# Patient Record
Sex: MALE | Race: BLACK OR AFRICAN AMERICAN | Employment: STUDENT | URBAN - METROPOLITAN AREA
[De-identification: names, ages, dates, MRNs, and addresses within clinical notes are randomized per-mention and may not be internally consistent; named-entity substitution may affect disease eponyms.]

---
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
## 2024-01-24 ENCOUNTER — OFFICE VISIT (OUTPATIENT)
Dept: FAMILY MEDICINE CLINIC | Facility: CLINIC | Age: 24
End: 2024-01-24
Payer: MEDICAID

## 2024-01-24 VITALS
WEIGHT: 171.81 LBS | HEIGHT: 71 IN | OXYGEN SATURATION: 97 % | TEMPERATURE: 98 F | RESPIRATION RATE: 16 BRPM | SYSTOLIC BLOOD PRESSURE: 108 MMHG | BODY MASS INDEX: 24.05 KG/M2 | HEART RATE: 58 BPM | DIASTOLIC BLOOD PRESSURE: 52 MMHG

## 2024-01-24 DIAGNOSIS — M79.601 BILATERAL ARM PAIN: ICD-10-CM

## 2024-01-24 DIAGNOSIS — Z11.3 ROUTINE SCREENING FOR STI (SEXUALLY TRANSMITTED INFECTION): ICD-10-CM

## 2024-01-24 DIAGNOSIS — Z00.00 ROUTINE GENERAL MEDICAL EXAMINATION AT A HEALTH CARE FACILITY: Primary | ICD-10-CM

## 2024-01-24 DIAGNOSIS — M79.602 BILATERAL ARM PAIN: ICD-10-CM

## 2024-01-24 PROCEDURE — 90686 IIV4 VACC NO PRSV 0.5 ML IM: CPT | Performed by: FAMILY MEDICINE

## 2024-01-24 PROCEDURE — 99203 OFFICE O/P NEW LOW 30 MIN: CPT | Performed by: FAMILY MEDICINE

## 2024-01-24 PROCEDURE — 3008F BODY MASS INDEX DOCD: CPT | Performed by: FAMILY MEDICINE

## 2024-01-24 PROCEDURE — 3074F SYST BP LT 130 MM HG: CPT | Performed by: FAMILY MEDICINE

## 2024-01-24 PROCEDURE — 99385 PREV VISIT NEW AGE 18-39: CPT | Performed by: FAMILY MEDICINE

## 2024-01-24 PROCEDURE — 3078F DIAST BP <80 MM HG: CPT | Performed by: FAMILY MEDICINE

## 2024-01-24 PROCEDURE — 90471 IMMUNIZATION ADMIN: CPT | Performed by: FAMILY MEDICINE

## 2024-01-24 NOTE — PROGRESS NOTES
Sugey Quintana is a 23 year old male who is here for   Chief Complaint   Patient presents with    Wellness Visit    Pain     Bilateral biceps since August pain is worse when exercise         HPI:     1. Routine general medical examination at a health care facility  -due for wellness  -notes he is up to date with childhood vaccinations    2. Bilateral arm pain  -started 2 months ago  -preceded by intensely working out biceps   -thought was sore, but tried to work through it      Screening:  Diet: watching diet  Exercise: trying  Sleep: normal  Depression/Anxiety: none    Testicular CA - counseled   Prostate CA - There are no preventive care reminders to display for this patient. No fam hx  Colon CA - No recommendations at this time no fam hx    No smoking cigarettes  No ETOH  Marijuana off and on - few times per week      History   Smoking Status    Never   Smokeless Tobacco    Not on file       The patient is not currently a tobacco user.  Counseling given: Not Answered      History   Alcohol Use Not Currently       History   Drug Use    Types: Cannabis         Depression Screening (PHQ-2/PHQ-9): Over the LAST 2 WEEKS   Little interest or pleasure in doing things (over the last two weeks)?: Not at all  Little interest or pleasure in doing things: Not at all    Feeling down, depressed, or hopeless (over the last two weeks)?: Not at all  Feeling down, depressed, or hopeless: Not at all    PHQ-2 SCORE: 0  PHQ-2 SCORE: 0          Pertinent Fam Hx:    Family History   Problem Relation Age of Onset    Diabetes Maternal Grandmother        Social History     Socioeconomic History    Marital status: Single   Tobacco Use    Smoking status: Never   Vaping Use    Vaping Use: Never used   Substance and Sexual Activity    Alcohol use: Not Currently    Drug use: Yes     Types: Cannabis   Other Topics Concern    Caffeine Concern No    Exercise Yes    Seat Belt Yes    Special Diet No    Stress Concern No    Weight Concern No        Wt Readings from Last 6 Encounters:   01/24/24 171 lb 12.8 oz (77.9 kg)   10/05/20 145 lb (65.8 kg) (46%, Z= -0.10)*     * Growth percentiles are based on CDC (Boys, 2-20 Years) data.       There is no problem list on file for this patient.      No current outpatient medications on file prior to visit.     No current facility-administered medications on file prior to visit.       REVIEW OF SYSTEMS:     See HPI for relevant ROS  GENERAL HEALTH: no other complaints  NEURO: no other complaints  VISION: no other complaints  RESPIRATORY: no other complaints  CARDIOVASCULAR: no other complaints  GI: no other complaints  : no other complaints  SKIN: no other complaints  PSYCH: no other complaints  EXT: no other complaints    Wt Readings from Last 6 Encounters:   01/24/24 171 lb 12.8 oz (77.9 kg)   10/05/20 145 lb (65.8 kg) (46%, Z= -0.10)*     * Growth percentiles are based on CDC (Boys, 2-20 Years) data.       No Known Allergies    There is no problem list on file for this patient.      Family History   Problem Relation Age of Onset    Diabetes Maternal Grandmother       Past Medical History:   Diagnosis Date    Asthma       History reviewed. No pertinent surgical history.   Social History:    Social History     Socioeconomic History    Marital status: Single   Tobacco Use    Smoking status: Never   Vaping Use    Vaping Use: Never used   Substance and Sexual Activity    Alcohol use: Not Currently    Drug use: Yes     Types: Cannabis   Other Topics Concern    Caffeine Concern No    Exercise Yes    Seat Belt Yes    Special Diet No    Stress Concern No    Weight Concern No           EXAM:   /52   Pulse 58   Temp 97.7 °F (36.5 °C) (Temporal)   Resp 16   Ht 5' 11\" (1.803 m)   Wt 171 lb 12.8 oz (77.9 kg)   SpO2 97%   BMI 23.96 kg/m²     GENERAL: A&O, in no apparent distress  HEENT: atraumatic, MMM, throat is clear  EYES: PERRLA, EOMI  NECK: supple, no thyromegaly  CHEST: no tenderness  LUNGS: clear to  auscultation bilateraly, no c/w/r  CARDIO: RRR without murmurs  NEURO: CN II-XII grossly intact  PSYCH: pleasant  MUSCULOSKELETAL: normal gait, no appreciable defects  SKIN: no rashes,no suspicious lesions    Problem focused exam (for problems outside of physical, if any):  Pain with palpation of b/l distal biceps tendon    The ASCVD Risk score (Luis Enrique DK, et al., 2019) failed to calculate for the following reasons:    The 2019 ASCVD risk score is only valid for ages 40 to 79    ASSESSMENT AND PLAN:     Health Maintenance  -We discussed the following:  Healthy diet and exercise, immunizations, and cancer screening    -Fasting labs ordered    1. Routine general medical examination at a health care facility  - Fluzone Quadrivalent 6mo and older, 0.5mL  - CBC With Differential With Platelet; Future  - Comp Metabolic Panel (14); Future  - Lipid Panel; Future  - TSH W Reflex To Free T4; Future  - HIV Ag/Ab Combo; Future  - Chlamydia/Gc Amplification; Future  - T Pallidum Screening [facility]; Future  - Hepatitis Panel, Acute (4); Future    2. Routine screening for STI (sexually transmitted infection)  - HIV Ag/Ab Combo; Future  - Chlamydia/Gc Amplification; Future  - T Pallidum Screening Cascade; Future  - Hepatitis Panel, Acute (4); Future    3. Bilateral arm pain  -likely overuse/strain  -limit workout of biceps/back for now  -limit weights  -focus more on cardio  -consider restarting in 2 months - but take it easy  -f/u prn         Chronic Conditions:    No problem-specific Assessment & Plan notes found for this encounter.       Immunizations:      Topic Date Due    Hepatitis B Vaccines (1 of 3 - 3-dose series) Never done    MMR Vaccines (1 of 1 - Standard series) Never done    Varicella Vaccines (1 of 2 - 2-dose childhood series) Never done    DTaP,Tdap,and Td Vaccines (1 - Tdap) Never done        Health Maintenance:  Health Maintenance   Topic Date Due    Annual Physical  Never done    Hepatitis B Vaccines (1 of 3 -  3-dose series) Never done    MMR Vaccines (1 of 1 - Standard series) Never done    Varicella Vaccines (1 of 2 - 2-dose childhood series) Never done    DTaP,Tdap,and Td Vaccines (1 - Tdap) Never done    COVID-19 Vaccine (3 - 2023-24 season) 09/01/2023    Influenza Vaccine (1) 10/01/2023    Annual Depression Screening  Completed    Meningococcal Vaccine  Completed    HPV Vaccines  Completed    Pneumococcal Vaccine: Birth to 64yrs  Aged Out    Hepatitis A Vaccines  Aged Out       Orders This Visit:  Orders Placed This Encounter   Procedures    CBC With Differential With Platelet    Comp Metabolic Panel (14)    Lipid Panel    TSH W Reflex To Free T4    HIV Ag/Ab Combo    T Pallidum Screening [facility]    Hepatitis Panel, Acute (4)    Fluzone Quadrivalent 6mo and older, 0.5mL    Chlamydia/Gc Amplification       Meds This Visit:  Requested Prescriptions      No prescriptions requested or ordered in this encounter       Imaging & Referrals:  INFLUENZA VAC, QUAD, PRSV FREE, 0.5 ML     The patient indicates understanding of these issues and agrees to the plan.  The patient is asked to return in prn.  MELANIE VARGAS MD    I spent a total of 30 minutes, more than half of which was spent counseling/coordinating care regarding biceps pain (outside of time for wellness)

## 2024-01-24 NOTE — PATIENT INSTRUCTIONS
Stop working out arms and back  Wait at least 2-3 months and slowly reintroduce to give more time to heal  Try not to overdo anything and take it slow    Go to lab for blood and urine test    Followup in 1 yr, sooner if needed    --------------------------------------------------------------------    If labs ordered:  -- schedule appt for fasting bloodwork anytime that you are able to (fast for 8-10 hours minimum, no food.  Water is fine).  -- go to [employer] or use [email or web address] to schedule [facility] labs  -- call [facility] or use website to schedule labs if your insurance prefers [facility] (Always confirm your preferred lab with your insurance, and let us know if you need labs ordered at a specific location)  -- we will call with results about 5-7 days after bloodwork is completed    Always verify coverage of any testing or specialist referral with your insurance    Work on healthy nutrition:  -focus on plant based, low-fat proteins  -limit fatty, red, or processed meats  -decrease carbohydrates (bread, rice, pasta, tortillas, sweets, sodas, juice, energy drinks)  -eat more fruits and veggies  -1/2 of every meal should be fruits/veggies; 1/4 should be protein, only 1/4 should be carbohydrates  -can work on decreasing portion sizes with each meal and drink plenty of water with each meal   -eat slowly - the brain can take up to 20min to realize stomach is full (easier to overeat when you eat fast)  -try to eat consistently throughout the day - can use healthy proteins or fiber rich foods for snacks in between meals (nuts, oatmeal, fruits, veggies)  -can you calorie tracking apps (myfitness pal or similar) to everything you eat for up to 2 wks to get a sense of what you are eating    Increase exercise:  -goal is 20-30min of continuous cardio (increased heart-rate and sweating) for 3-4 times per week  -work your way slowly up to this  -can focus on low impact exercises (elliptical, cycling, swimming) if you have joint pains  with walking/jogging/running    For sleep:  -make sure room is dark and quiet  -no reading, tv, phone, tablets, computers in bed - these can activate the brain over time and associate being awake with being in the bed  -if you are not sleeping, leave the bedroom, do any of the above until you are tired, then try again  -this will help reinforce with the brain the the bed is for sleeping  -consider melatonin 5-6mg nightly for 4-6 wks to help with sleep  -can use OTC benadryl or unisom as needed on top of this    Skin health:  -always use sunscreen (30+ spf) if out in the sun for longer than 10-15min  -cover up if needed  -reapply sunscreen every 2 hours  -consider seeing a dermatologist for a full skin exam every 1-2 years if you have had a lot of sun exposure in your life or if you have a lot of moles

## 2024-05-29 ENCOUNTER — OFFICE VISIT (OUTPATIENT)
Dept: FAMILY MEDICINE CLINIC | Facility: CLINIC | Age: 24
End: 2024-05-29

## 2024-05-29 VITALS
WEIGHT: 171.81 LBS | TEMPERATURE: 98 F | SYSTOLIC BLOOD PRESSURE: 112 MMHG | DIASTOLIC BLOOD PRESSURE: 76 MMHG | HEART RATE: 53 BPM | HEIGHT: 71 IN | RESPIRATION RATE: 16 BRPM | BODY MASS INDEX: 24.05 KG/M2 | OXYGEN SATURATION: 98 %

## 2024-05-29 DIAGNOSIS — M79.601 BILATERAL ARM PAIN: Primary | ICD-10-CM

## 2024-05-29 DIAGNOSIS — M79.602 BILATERAL ARM PAIN: Primary | ICD-10-CM

## 2024-05-29 PROCEDURE — 99214 OFFICE O/P EST MOD 30 MIN: CPT | Performed by: FAMILY MEDICINE

## 2024-05-29 NOTE — PROGRESS NOTES
Sugey Quintana is a 23 year old male here for   Chief Complaint   Patient presents with    Arm Pain     Bilateral bicep and arm pain        HPI:       1. Bilateral arm pain  -notes pain b/l in both arms at distal aspect of biceps/tendons  -was worse previously (5 months ago) after heavy work outs  -since then, has stopped workouts and still not better  -pain worse when carrying his nephew or lifting anything with his arms          HISTORY:  Past Medical History:    Asthma (HCC)      History reviewed. No pertinent surgical history.   Family History   Problem Relation Age of Onset    Diabetes Maternal Grandmother       Social History:   Social History     Socioeconomic History    Marital status: Single   Tobacco Use    Smoking status: Never   Vaping Use    Vaping status: Never Used   Substance and Sexual Activity    Alcohol use: Not Currently    Drug use: Yes     Types: Cannabis   Other Topics Concern    Caffeine Concern No    Exercise Yes    Seat Belt Yes    Special Diet No    Stress Concern No    Weight Concern No     Social Determinants of Health      [facility] [employer] [employer]        Medications (Active prior to today's visit):  No current outpatient medications on file.       Allergies:  No Known Allergies      ROS:   See HPI for relevant ROS    --GEN: No other complaints  --HEENT: No other complaints  --RESP: No other complaints  --CV: No other complaints  --GI: No other complaints  --MSK: No other complaints    All other systems reviewed are negative    PHYSICAL EXAM:   /76 (BP Location: Right arm, Patient Position: Sitting, Cuff Size: adult)   Pulse 53   Temp 98 °F (36.7 °C) (Temporal)   Resp 16   Ht 5' 11\" (1.803 m)   Wt 171 lb 12.8 oz (77.9 kg)   SpO2 98%   BMI 23.96 kg/m²     Gen: NAD  Ext: pain on palpation of b/l distal biceps tendon  Psych: normal affect     ASSESSMENT/PLAN:     1. Bilateral arm pain  -referred to PT  -avoid anything that makes pain worse    - OP  REFERRAL TO [facility]        Chronic Conditions:    No problem-specific Assessment & Plan notes found for this encounter.       Health Maintenance:  Health Maintenance   Topic Date Due    MMR Vaccines (1 of 1 - Standard series) Never done    Varicella Vaccines (1 of 2 - 13+ 2-dose series) Never done    DTaP,Tdap,and Td Vaccines (1 - Tdap) Never done    Hepatitis B Vaccines (1 of 3 - 19+ 3-dose series) Never done    COVID-19 Vaccine (3 - 2023-24 season) 09/01/2023    Annual Physical  01/24/2025    Influenza Vaccine  Completed    Annual Depression Screening  Completed    Meningococcal Vaccine  Completed    HPV Vaccines  Completed    Pneumococcal Vaccine: Birth to 64yrs  Aged Out    Hepatitis A Vaccines  Aged Out               The patient is asked to return in prn.    Orders This Visit:  No orders of the defined types were placed in this encounter.      Meds This Visit:  Requested Prescriptions      No prescriptions requested or ordered in this encounter       Imaging & Referrals:  OP REFERRAL TO [facility]     MELANIE VARGAS MD    I spent a total of 30 minutes, more than half of which was spent counseling/coordinating care regarding arm pain

## 2024-06-24 ENCOUNTER — OFFICE VISIT (OUTPATIENT)
Dept: PHYSICAL THERAPY | Age: 24
End: 2024-06-24
Attending: FAMILY MEDICINE
Payer: MEDICAID

## 2024-06-24 DIAGNOSIS — M79.601 BILATERAL ARM PAIN: Primary | ICD-10-CM

## 2024-06-24 DIAGNOSIS — M79.602 BILATERAL ARM PAIN: Primary | ICD-10-CM

## 2024-06-24 PROCEDURE — 97110 THERAPEUTIC EXERCISES: CPT

## 2024-06-24 PROCEDURE — 97161 PT EVAL LOW COMPLEX 20 MIN: CPT

## 2024-06-24 NOTE — PROGRESS NOTES
SHOULDER EVALUATION:     Diagnosis:   Bilateral arm pain (M79.601,M79.602)   Referring Provider: Mynor Anne MD Date of Evaluation:    6/24/2024    Precautions:  None Next MD visit:   none scheduled  Date of Surgery: n/a     PATIENT SUMMARY   Sugey Quintana is a 23 year old male who presents to therapy today with complaints of B distal bicep pain for almost 1 year. When onset, denies any bruising ; was advised to decrease the amount of weight he was lifting. Ultimately he stopped working out entirely ~6 months ago 2o this pain. When he was working out, he does admit he wasn't warming up, but he did focus on proper form. He 'felt like his biceps were going to explode\" when doing dumbbell bicep curls 40-45# each arm, but c limited reps, about 4 days a week. Denies any neck pain, UE N/T, or radiating pain proximal or distal to the elbow. Recalls a brief period when he was about 9 y/o when his RUE was in a sling for a short period ; no cast ; does not recall reason. Was in PT p 'falling on his head' playing basketball, about 2 yrs ago. Pain only in antecubital fossa. R hand dominant. Denies any other areas of tendon pain like knees or ankles. Sometimes can even wake up and already hurting - back or side sleeper.     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   Current functional limitations include pain c carrying 2 y/o cousin, lifting heavy groceries, when sitting at rest c elbows bent, UA to weightlift for exercise.     Sugey describes prior level of function full and painfree. Pt goals include painfree ADLs and at rest, able to return to weightlifting.  Past medical history was reviewed with Sugey. Significant findings include asthma.     ASSESSMENT  Sugey presents to physical therapy evaluation with primary c/o B bicep pain for past 1 year. The results of the objective tests and measures show TTP B biceps muscle belly and insertions, pain c  full elbow extension c overpressure.  Pt demo full and painfree MMT testing and AROM BUEs. Functional deficits include but are not limited to pain c lifting heavy groceries, when sitting at rest c elbows bent, carrying 2 y/o cousin, UA to weightlift for exercise.  Signs and symptoms are consistent with rehab diagnosis, possible chronic insertional biceps tendonitis. Pt and PT discussed evaluation findings, pathology, POC and HEP.  Pt voiced understanding and performs HEP correctly without reported pain. Skilled Physical Therapy is medically necessary to address the above impairments and reach functional goals.     OBJECTIVE:   Observation/Posture: scar tissue in occipital region from previous fall playing basketball ; normal muscular tone in BUEs s abnormalities  Palpation: TTP throughout B biceps muscle belly  Sensation: intact to light touch BUEs  Cervical Screen: full and painfree all planes s any change in UE symptoms    ROM: (* denotes performed with pain)  AROM Full and painfree B shoulders and B elbows  PROM elbow flexion: full and painfree B c overpressure  PROM elbow extension: full c mild posterior elbow pain c light overpressure B    Strength/MMT: (* denotes performed with pain)  @eval:   Elbow Flexion: R 5/5; L 5/5  Elbow Extension: R 5/5; L 5/5  Supination: R 5/5; L 5/5  Pronation: R 5/5; L 5/5      Special tests:   (-) Speed's test B  Muscle flexibility/length: WNL painfree B biceps and triceps  (-) ULNTT's medial, ulnar, radial B  (-) Maggy for thoracic outlet    Today’s Treatment and Response:   Pt education was provided on exam findings, treatment diagnosis, treatment plan, expectations, and prognosis. Pt was also provided recommendations for activity modifications and modalities as needed [ice/heat].  Patient was instructed in and issued a HEP for:   Access Code: [email or web address] ; URL: [email or web address] ; Prepared by: Malena Fam  Date: 06/25/2024  Exercises  - Sidelying Thoracic  Rotation with Open Book  - 2 x daily - 10 reps - 5 hold  - Doorway Pec Stretch at 90 Degrees Abduction  - 2 x daily - 3 reps - 15 hold    Charges: PT Maryaal Low Complexity, therex-1      Total Timed Treatment: 15 min     Total Treatment Time: 40 min     Based on clinical rationale and outcome measures, this evaluation involved Low Complexity decision making .  PLAN OF CARE:    Goals: (to be met in 8 visits)   Consistently decr pain < or = 3/10 for incr QOL and activity tolerance  Overall incr in function as indicated by decr QuickDASH at least 10 pts  Painfree at rest to indicate decr irritability of symptoms  Painfree full elbow extension c overpressure to indicate decr irritability of symptoms  Pt tolerates lifting/carrying groceries painfree for incr functional use of BUEs  Pt tolerates progressively more difficult resistive exercise in PT remaining painfree to indicate incr exercise tolerance/PLOF  Indep HEP to promote cont progress toward functional goals    Frequency / Duration: Patient will be seen for 1-2 x/week or a total of 6 visits over a 90 day period. Treatment will include: Manual Therapy, Therapeutic Exercise, Home Exercise Program instruction, and Modalities to include: Ultrasound and dry needling if indicated    Education or treatment limitation: None  Rehab Potential:good    QuickDASH Outcome Score  Score: 13.64 % (6/24/2024 11:51 AM)      Patient/Family/Caregiver was advised of these findings, precautions, and treatment options and has agreed to actively participate in planning and for this course of care.    Thank you for your referral. Please co-sign or sign and return this letter via fax as soon as possible to [phone]. If you have any questions, please contact me at Dept: [phone]    Sincerely,  Electronically signed by therapist: Malena Fam, PT  Physician's certification required: Yes  I certify the need for these services furnished under this plan of treatment and while under my  care.    X___________________________________________________ Date____________________    Certification From: 6/24/2024  To:9/22/2024

## 2024-06-26 ENCOUNTER — APPOINTMENT (OUTPATIENT)
Dept: PHYSICAL THERAPY | Age: 24
End: 2024-06-26
Attending: FAMILY MEDICINE
Payer: MEDICAID

## 2024-06-28 NOTE — PROGRESS NOTES
PT DAILY NOTE  Diagnosis:   Bilateral arm pain (M79.601,M79.602)   Referring Provider: Mynor Anne MD Date of Evaluation:    6/24/2024    Precautions:  None Next MD visit:   none scheduled  Date of Surgery: n/a     Insurance Primary/Secondary: [place] [employer]     Visit 2 of 6   Date POC Expires: 8-23-24       Subjective: Pt states he feels about the same. Sore in front of elbows and in biceps muscles B.  Pain:      1/10   @eval: Sugey Quintana is a 23 year old male who presents to therapy today with complaints of B distal bicep pain for almost 1 year. When onset, denies any bruising ; was advised to decrease the amount of weight he was lifting. Ultimately he stopped working out entirely ~6 months ago 2o this pain. When he was working out, he does admit he wasn't warming up, but he did focus on proper form. He 'felt like his biceps were going to explode\" when doing dumbbell bicep curls 40-45# each arm, but c limited reps, about 4 days a week. Denies any neck pain, UE N/T, or radiating pain proximal or distal to the elbow. Recalls a brief period when he was about 7 y/o when his RUE was in a sling for a short period ; no cast ; does not recall reason. Was in PT p 'falling on his head' playing basketball, about 2 yrs ago. Pain only in antecubital fossa. R hand dominant. Denies any other areas of tendon pain like knees or ankles. Sometimes can even wake up and already hurting - back or side sleeper.   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   Current functional limitations include pain c carrying 2 y/o cousin, lifting heavy groceries, when sitting at rest c elbows bent, UA to weightlift for exercise.   Rosendojoyce describes prior level of function full and painfree. Pt goals include painfree ADLs and at rest, able to return to weightlifting.  Past medical history was reviewed with Sugey. Significant findings include asthma.      Objective:   Tightness/prominence and TTP noted in proximal pronator teres B, in addition to biceps muscle belly      Treatment:  (\"NP\" indicates Not Performed this date)  Manual Therapy:  Rogers B biceps, proximal pronator teres in elbow extension    Neuromuscular Re-education:    Therapeutic Exercise: Eval 6-24-24 7-1-24   Retro UBE for scap retraction/upp thor ext   3'   S/L open book R,L 5\"x5ea  5\"x10ea   Doorway pec stretch 15\"x3     Standing wrist flexor stretch  20\"x3ea    Standing bicep stretch R,L  20\"x3ea     Eccentric biceps-standing R,L   8#x20ea                                    HEP:  Access Code: [email or web address] ; URL: [email or web address] ; Prepared by: Malena Fam  Date: 06/25/2024  Exercises  - Sidelying Thoracic Rotation with Open Book  - 2 x daily - 10 reps - 5 hold  - Doorway Pec Stretch at 90 Degrees Abduction  - 2 x daily - 3 reps - 15 hold  7-1: add - Standing Wrist Extension Stretch  - 2 x daily - 3 reps - 20 hold ; - Standing Bicep Stretch at Wall  - 2 x daily - 3 reps - 20 hold ; eccentric biceps c 8-10# wt      Assessment/Plan: Pt has no change to symptoms thus far. Muscle tightness, TTP noted medial elbow B c prominence of pronator teres. Non-TTP medial epicondyle. Added manual tx and additional stretches to promote improved mobility of soft tissues in this region. Also added eccentric biceps strengthening to promote tendon strengthening/remodeling. Continue to address deficits to work toward PLOF and set goals.    PLAN OF CARE:    Goals: (to be met in 8 visits)   Consistently decr pain < or = 3/10 for incr QOL and activity tolerance  Overall incr in function as indicated by decr QuickDASH at least 10 pts  Painfree at rest to indicate decr irritability of symptoms  Painfree full elbow extension c overpressure to indicate decr irritability of symptoms  Pt tolerates lifting/carrying groceries painfree for incr functional use of BUEs  Pt tolerates  progressively more difficult resistive exercise in PT remaining painfree to indicate incr exercise tolerance/PLOF  Indep HEP to promote cont progress toward functional goals    Frequency / Duration: Patient will be seen for 1-2 x/week or a total of 6 visits over a 90 day period.    All Treatments performed at distinct and separate times during the therapy session.  Treatment Minutes  Units charged   Manual Therapy 20 minutes 1   Therapeutic Activity 0 minutes    Neuromuscular Re-education 0 minutes    Therapeutic Exercise 25 minutes 2   Total Direct Treatment Time 45 minutes

## 2024-07-01 ENCOUNTER — OFFICE VISIT (OUTPATIENT)
Dept: PHYSICAL THERAPY | Age: 24
End: 2024-07-01
Attending: FAMILY MEDICINE
Payer: MEDICAID

## 2024-07-01 DIAGNOSIS — M79.601 BILATERAL ARM PAIN: Primary | ICD-10-CM

## 2024-07-01 DIAGNOSIS — M79.602 BILATERAL ARM PAIN: Primary | ICD-10-CM

## 2024-07-01 PROCEDURE — 97110 THERAPEUTIC EXERCISES: CPT

## 2024-07-01 PROCEDURE — 97140 MANUAL THERAPY 1/> REGIONS: CPT

## 2024-07-08 ENCOUNTER — APPOINTMENT (OUTPATIENT)
Dept: PHYSICAL THERAPY | Age: 24
End: 2024-07-08
Attending: FAMILY MEDICINE
Payer: MEDICAID

## 2024-07-10 ENCOUNTER — APPOINTMENT (OUTPATIENT)
Dept: PHYSICAL THERAPY | Age: 24
End: 2024-07-10
Attending: FAMILY MEDICINE
Payer: MEDICAID

## 2024-07-10 NOTE — PROGRESS NOTES
PT DAILY NOTE  Diagnosis:   Bilateral arm pain (M79.601,M79.602)   Referring Provider: Mynor Anne MD Date of Evaluation:    6/24/2024    Precautions:  None Next MD visit:   none scheduled  Date of Surgery: n/a     Insurance Primary/Secondary: [place] [employer]     Visit 3 of 6   Date POC Expires: 8-23-24       Subjective: Pt states he found an 8# dumbbell at home that he has been using for eccentric biceps exercise. He isn't feeling the pain anymore when driving.   Pain:      1/10   @eval: Sugey Quintana is a 23 year old male who presents to therapy today with complaints of B distal bicep pain for almost 1 year. When onset, denies any bruising ; was advised to decrease the amount of weight he was lifting. Ultimately he stopped working out entirely ~6 months ago 2o this pain. When he was working out, he does admit he wasn't warming up, but he did focus on proper form. He 'felt like his biceps were going to explode\" when doing dumbbell bicep curls 40-45# each arm, but c limited reps, about 4 days a week. Denies any neck pain, UE N/T, or radiating pain proximal or distal to the elbow. Recalls a brief period when he was about 9 y/o when his RUE was in a sling for a short period ; no cast ; does not recall reason. Was in PT p 'falling on his head' playing basketball, about 2 yrs ago. Pain only in antecubital fossa. R hand dominant. Denies any other areas of tendon pain like knees or ankles. Sometimes can even wake up and already hurting - back or side sleeper.   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   Current functional limitations include pain c carrying 2 y/o cousin, lifting heavy groceries, when sitting at rest c elbows bent, UA to weightlift for exercise.   Sugey describes prior level of function full and painfree. Pt goals include painfree ADLs and at rest, able to return to weightlifting.  Past medical history was reviewed  with Sugey. Significant findings include asthma.     Objective:     Treatment:  (\"NP\" indicates Not Performed this date)  Manual Therapy:  Randy and ALE B biceps, proximal pronator teres in elbow extension    Neuromuscular Re-education:    Therapeutic Exercise: Eval 6-24-24 7-1-24 7-11-24   Retro UBE for scap retraction/upp thor ext   3' 4'   S/L open book R,L 5\"x5ea  5\"x10ea 5\"x10ea   Doorway pec stretch 15\"x3      Standing wrist flexor stretch  20\"x3ea  20\"x3ea   Standing bicep stretch R,L  20\"x3ea   20\"x3ea   Eccentric biceps-standing R,L   8#x20ea 8#x20ea   Triceps extension on FM R,L    13#2x10ea                                 HEP:  Access Code: [email or web address] ; URL: [email or web address] ; Prepared by: Malena Fam  Date: 06/25/2024  Exercises  - Sidelying Thoracic Rotation with Open Book  - 2 x daily - 10 reps - 5 hold  - Doorway Pec Stretch at 90 Degrees Abduction  - 2 x daily - 3 reps - 15 hold  7-1: add - Standing Wrist Extension Stretch  - 2 x daily - 3 reps - 20 hold ; - Standing Bicep Stretch at Wall  - 2 x daily - 3 reps - 20 hold ; eccentric biceps c 8-10# wt  7-11: add triceps extension      Assessment/Plan: Pt reports decr in symptoms when at rest and when driving. Continued symptoms when lifting/carrying. Added triceps strengthening to promote reciprocal inhibition to biceps. Pt to add to HEP and f/u next week.    PLAN OF CARE:    Goals: (to be met in 8 visits)   Consistently decr pain < or = 3/10 for incr QOL and activity tolerance  Overall incr in function as indicated by decr QuickDASH at least 10 pts  Painfree at rest to indicate decr irritability of symptoms  Painfree full elbow extension c overpressure to indicate decr irritability of symptoms  Pt tolerates lifting/carrying groceries painfree for incr functional use of BUEs  Pt tolerates progressively more difficult resistive exercise in PT remaining painfree to indicate incr exercise tolerance/PLOF  Indep HEP to promote  cont progress toward functional goals    Frequency / Duration: Patient will be seen for 1-2 x/week or a total of 6 visits over a 90 day period.    All Treatments performed at distinct and separate times during the therapy session.  Treatment Minutes  Units charged   Manual Therapy 20 minutes 1   Therapeutic Activity 0 minutes    Neuromuscular Re-education 0 minutes    Therapeutic Exercise 25 minutes 2   Total Direct Treatment Time 45 minutes

## 2024-07-11 ENCOUNTER — OFFICE VISIT (OUTPATIENT)
Dept: PHYSICAL THERAPY | Age: 24
End: 2024-07-11
Attending: FAMILY MEDICINE
Payer: MEDICAID

## 2024-07-11 DIAGNOSIS — M79.601 BILATERAL ARM PAIN: Primary | ICD-10-CM

## 2024-07-11 DIAGNOSIS — M79.602 BILATERAL ARM PAIN: Primary | ICD-10-CM

## 2024-07-11 PROCEDURE — 97110 THERAPEUTIC EXERCISES: CPT

## 2024-07-11 PROCEDURE — 97140 MANUAL THERAPY 1/> REGIONS: CPT

## 2024-07-18 ENCOUNTER — APPOINTMENT (OUTPATIENT)
Dept: PHYSICAL THERAPY | Age: 24
End: 2024-07-18
Attending: FAMILY MEDICINE
Payer: MEDICAID

## 2024-07-18 ENCOUNTER — TELEPHONE (OUTPATIENT)
Dept: PHYSICAL THERAPY | Age: 24
End: 2024-07-18

## 2025-05-15 ENCOUNTER — TELEPHONE (OUTPATIENT)
Dept: FAMILY MEDICINE CLINIC | Facility: CLINIC | Age: 25
End: 2025-05-15

## 2025-05-15 NOTE — TELEPHONE ENCOUNTER
Sugey Quintana was scheduled for an appt on 5/14/2025 with a visit reason of biceps and arms.    Letter/MyChart message sent to pt notifying of missed appointment with $50 no show fee.

## (undated) NOTE — LETTER
5/15/2025    Sugey Quintana  [address] [place] [address]    Dear Sugey,    Our records indicate you did not show for your appointment on 5/14/2025.    To accommodate all our patients, we request at least 24 hours' notice if you need to cancel or reschedule your appointment.    If three appointments are missed within a 12-month period, we may begin the dismissal process for future care at [facility].    We value the relationship we have established with you.  We hope to continue to serve your health care needs.        [facility]